# Patient Record
Sex: FEMALE | Race: BLACK OR AFRICAN AMERICAN | NOT HISPANIC OR LATINO | URBAN - METROPOLITAN AREA
[De-identification: names, ages, dates, MRNs, and addresses within clinical notes are randomized per-mention and may not be internally consistent; named-entity substitution may affect disease eponyms.]

---
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
## 2025-01-13 NOTE — ED PROVIDER NOTE - DISPOSITION TYPE
Medication: Tadalafil 20 mg passed protocol.   Last office visit date: 1/17/24  Next appointment scheduled?: Yes   Number of refills given: 3  
DISCHARGE